# Patient Record
Sex: FEMALE | Race: BLACK OR AFRICAN AMERICAN | NOT HISPANIC OR LATINO | Employment: FULL TIME | URBAN - METROPOLITAN AREA
[De-identification: names, ages, dates, MRNs, and addresses within clinical notes are randomized per-mention and may not be internally consistent; named-entity substitution may affect disease eponyms.]

---

## 2017-03-02 ENCOUNTER — OFFICE VISIT (OUTPATIENT)
Dept: OBSTETRICS AND GYNECOLOGY | Facility: CLINIC | Age: 32
End: 2017-03-02
Payer: COMMERCIAL

## 2017-03-02 VITALS
SYSTOLIC BLOOD PRESSURE: 125 MMHG | WEIGHT: 176.13 LBS | BODY MASS INDEX: 30.07 KG/M2 | DIASTOLIC BLOOD PRESSURE: 76 MMHG | HEIGHT: 64 IN

## 2017-03-02 DIAGNOSIS — Z01.419 ENCOUNTER FOR GYNECOLOGICAL EXAMINATION WITHOUT ABNORMAL FINDING: ICD-10-CM

## 2017-03-02 DIAGNOSIS — N63.0 BREAST MASS: ICD-10-CM

## 2017-03-02 DIAGNOSIS — N76.0 ACUTE VAGINITIS: Primary | ICD-10-CM

## 2017-03-02 DIAGNOSIS — N93.9 ABNORMAL UTERINE BLEEDING: ICD-10-CM

## 2017-03-02 DIAGNOSIS — Z00.00 ANNUAL PHYSICAL EXAM: ICD-10-CM

## 2017-03-02 PROCEDURE — 99999 PR PBB SHADOW E&M-EST. PATIENT-LVL III: CPT | Mod: PBBFAC,,, | Performed by: OBSTETRICS & GYNECOLOGY

## 2017-03-02 PROCEDURE — 99213 OFFICE O/P EST LOW 20 MIN: CPT | Mod: S$GLB,,, | Performed by: OBSTETRICS & GYNECOLOGY

## 2017-03-02 PROCEDURE — 87480 CANDIDA DNA DIR PROBE: CPT

## 2017-03-02 PROCEDURE — 1160F RVW MEDS BY RX/DR IN RCRD: CPT | Mod: S$GLB,,, | Performed by: OBSTETRICS & GYNECOLOGY

## 2017-03-02 RX ORDER — METRONIDAZOLE 500 MG/1
500 TABLET ORAL 3 TIMES DAILY
Qty: 30 TABLET | Refills: 2 | Status: SHIPPED | OUTPATIENT
Start: 2017-03-02 | End: 2017-03-12

## 2017-03-02 NOTE — PROGRESS NOTES
Subjective:      Chief Complaint:  MIDCYCLE  BLEEDING  Chief Complaint   Patient presents with    Gynecologic Exam       Menstrual History:    OB History      Para Term  AB TAB SAB Ectopic Multiple Living    4 0   3     0        Obstetric Comments    LMP 10/17/12          Menarche age: 11     Patient's last menstrual period was 2017.           Objective:        History of Present Illness AND  Examination detailed DICTATE:         PRESENT ILLNESS/PHYSICAL EXAMINATION NOTE:  The patient is 31 years of age.     4, para 0.  Two ectopic pregnancies and one miscarriage.  No medical   problem.  The patient Pap smears in 2016 was negative.  Ultrasound at that time   was negative.  Surgical procedure, laparoscopy, LEEP and exploratory laparotomy.    The patient presented to the clinic for annual examination complaining of   midcycle bleeding.  It is kind of pinkish.  No pain, discomfort or any other   problems.  The patient is also complaining of mild discharge.    REVIEW OF SYSTEMS:  HEAD, EAR, EYES, NOSE, AND THROAT:  Negative.  CARDIORESPIRATORY:  Negative.  GASTROINTESTINAL:  Negative.  GENITOURINARY:  See present illness.  NEUROMUSCULAR:  Negative.    PHYSICAL EXAMINATION:  GENERAL:  Well-developed, well-nourished, alert, oriented female, not in acute   distress.  VITAL SIGNS:  Blood pressure 125/76, weight 176.  HEAD:  Normocephalic.  EYES:  React.  NECK:  Supple.  Thyroid is not palpable.  No nodes.  CHEST:  Clear.  HEART:  Regular sinus rhythm, no murmur.  BREASTS:  Multinodular areas in both breasts, mobile.  No skin changes,   retraction, discharge, fixation.  Axilla is negative.  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  ABDOMEN:  Upper abdomen normal.  Lower abdomen normal.  No guarding or rebound.    Bowel sounds normal.  PELVIC:  External normal.  Vulva normal.  Bartholin, urethral and [place] glands   are negative.  Vagina discharge, whitish with odor.  Cervix clear.   Uterus is   slightly irregular and mostly anteriorly.  Both ovaries are negative.  Good   pelvic support, mobility noted.  No pain on examination.  RECTAL:  External negative.  MUSCULOSKELETAL:  Negative.  NEUROLOGIC:  Grossly normal.  SKIN:  Clear.    IMPRESSION:  Possible bacterial vaginosis, multiple breast lumps.    PLAN:  We will do a mammogram and breast ultrasound.  Also vaginal culture was   obtained.  We will start the patient on metronidazole one three times a day.  We   gave her refill on it.  If the discharged has not completely improved, refill   of medication and use it for another 10 days.  Also, multivitamin, activity,   exercise recommended and we will see the patient back in six months to   reevaluate the breast.      REGAN  dd: 03/02/2017 10:28:11 (CST)  td: 03/02/2017 17:45:46 (CST)  Doc ID   #[id number]  Job ID #[id number]    CC:          Assessment:      Diagnosis:GYN  EXAM   BREAST   MASS VAGINITIS   ABN  BLEEDING       Plan:      Return in 6  months

## 2017-03-02 NOTE — MR AVS SNAPSHOT
[facility] - OB/ GYN  [address]  [address]  [place] [address]  Phone: [phone]                  Tri Valles   3/2/2017 9:45 AM   Office Visit    Description:  Female : 1985   Provider:  Stephon Gifford MD   Department:  [facility] - OB/ GYN           Reason for Visit     Gynecologic Exam           Diagnoses this Visit        Comments    Acute vaginitis    -  Primary     Annual physical exam         Encounter for gynecological examination without abnormal finding         Breast mass         Abnormal uterine bleeding                To Do List           Goals (5 Years of Data)     None      Follow-Up and Disposition     Return in about 4 weeks (around 3/30/2017).       These Medications        Disp Refills Start End    metronidazole (FLAGYL) 500 MG tablet 30 tablet 2 3/2/2017 3/12/2017    Take 1 tablet (500 mg total) by mouth 3 (three) times daily. - Oral    [facility] [id number] [place] [address] -  [address] AT [facility] [address] Ph #: [phone]         [facility]     [facility] Care [facility] -  Assistance  Registered nurses in the [facility] provide clinical advisement, health education, appointment booking, and other advisory services.  Call for this free service at [phone].             Medications           Message regarding Medications     Verify the changes and/or additions to your medication regime listed below are the same as discussed with your clinician today.  If any of these changes or additions are incorrect, please notify your healthcare provider.        START taking these NEW medications        Refills    metronidazole (FLAGYL) 500 MG tablet 2    Sig: Take 1 tablet (500 mg total) by mouth 3 (three) times daily.    Class: Normal    Route: Oral           Verify that the below list of medications is an accurate representation of the medications you are currently taking.  If none reported, the list  may be blank. If incorrect, please contact your healthcare provider. Carry this list with you in case of emergency.           Current Medications     metronidazole (FLAGYL) 500 MG tablet Take 1 tablet (500 mg total) by mouth 3 (three) times daily.           Clinical Reference Information           Your Vitals Were     BP                   125/76           Blood Pressure          Most Recent Value    BP  125/76      Allergies as of 3/2/2017     No Known Allergies      Immunizations Administered on Date of Encounter - 3/2/2017     None      Orders Placed During Today's Visit      Normal Orders This Visit    Vaginosis Screen by DNA Probe     Future Labs/Procedures Expected by Expires    Mammo Digital Diagnostic Bilat with CAD  3/2/2017 5/3/2018    US Breast Bilateral Limited  3/2/2017 5/1/2018    US Pelvis Comp with Transvag NON-OB (xpd  3/2/2017 3/2/2018      [facility] Sign-Up     Activating your [facility] account is as easy as 1-2-3!     1) Visit [email or web address], select Sign Up Now, enter this activation code and your date of birth, then select Next.  [email or web address]  Expires: 4/16/2017  9:56 AM      2) Create a username and password to use when you visit [facility] in the future and select a security question in case you lose your password and select Next.    3) Enter your e-mail address and click Sign Up!    Additional Information  If you have questions, please e-mail [email or web address] or call [phone] to talk to our [facility] staff. Remember, [facility] is NOT to be used for urgent needs. For medical emergencies, dial 911.         Language Assistance Services     ATTENTION: Language assistance services are available, free of charge. Please call [phone].      ATENCIÓN: Si habla español, tiene a adams disposición servicios gratuitos de asistencia lingüística. Llame al [phone].     CHÚ Ý: N?u b?n nói Ti?ng Vi?t, có các d?ch v? h? tr? ngôn ng? mi?n phí dành cho b?n. G?i s? [phone].          [facility] complies with applicable Federal civil rights laws and does not discriminate on the basis of race, color, national origin, age, disability, or sex.

## 2017-03-03 LAB
CANDIDA RRNA VAG QL PROBE: NEGATIVE
G VAGINALIS RRNA GENITAL QL PROBE: POSITIVE
T VAGINALIS RRNA GENITAL QL PROBE: NEGATIVE

## 2017-03-07 ENCOUNTER — HOSPITAL ENCOUNTER (OUTPATIENT)
Dept: RADIOLOGY | Facility: HOSPITAL | Age: 32
Discharge: HOME | End: 2017-03-07
Attending: OBSTETRICS & GYNECOLOGY
Payer: COMMERCIAL

## 2017-03-07 ENCOUNTER — TELEPHONE (OUTPATIENT)
Dept: OBSTETRICS AND GYNECOLOGY | Facility: CLINIC | Age: 32
End: 2017-03-07

## 2017-03-07 DIAGNOSIS — N63.0 BREAST MASS: ICD-10-CM

## 2017-03-07 DIAGNOSIS — N93.9 ABNORMAL UTERINE BLEEDING: ICD-10-CM

## 2017-03-07 PROCEDURE — 77062 BREAST TOMOSYNTHESIS BI: CPT | Mod: 26,,, | Performed by: RADIOLOGY

## 2017-03-07 PROCEDURE — 77066 DX MAMMO INCL CAD BI: CPT | Mod: TC

## 2017-03-07 PROCEDURE — 76856 US EXAM PELVIC COMPLETE: CPT | Mod: TC

## 2017-03-07 PROCEDURE — 77066 DX MAMMO INCL CAD BI: CPT | Mod: 26,,, | Performed by: RADIOLOGY

## 2017-03-07 PROCEDURE — 76856 US EXAM PELVIC COMPLETE: CPT | Mod: 26,,, | Performed by: RADIOLOGY

## 2017-03-07 PROCEDURE — 76830 TRANSVAGINAL US NON-OB: CPT | Mod: 26,,, | Performed by: RADIOLOGY

## 2017-03-07 NOTE — TELEPHONE ENCOUNTER
Notes Recorded by Tierra Mcdaniel LPN on 3/7/2017 at 10:19 AM  SPOKE WITH MS Dylan, INFORMED HER THAT  HER TEST FOR B.V., INFORMED HER THAT DR MAHMOOD HAS SENT A RX FOR FLAGYL 500MG TO HER PHARMACY, PT STATED SHE HAS ALREADY STARTED THE ANTIBX, PT STATED HER UNDERSTANDING  ------

## 2017-03-07 NOTE — TELEPHONE ENCOUNTER
Notes Recorded by Tierra Mcdaniel LPN on 3/7/2017 at 4:34 PM  SPOKE WIT MS Richardson , INFORMED HER THAT DR MAHMOOD READ THE [facility] AND THE [place] OF THE PELVIS REPORTS AND BOTH ARE NORMAL, PT STATED HER UNDERSTANDING  ------

## 2017-06-05 PROBLEM — Z00.00 ANNUAL PHYSICAL EXAM: Status: RESOLVED | Noted: 2017-03-02 | Resolved: 2017-06-05

## 2018-01-24 ENCOUNTER — OFFICE VISIT (OUTPATIENT)
Dept: OBSTETRICS AND GYNECOLOGY | Facility: CLINIC | Age: 33
End: 2018-01-24
Payer: COMMERCIAL

## 2018-01-24 VITALS
HEIGHT: 64 IN | WEIGHT: 178.38 LBS | SYSTOLIC BLOOD PRESSURE: 115 MMHG | DIASTOLIC BLOOD PRESSURE: 71 MMHG | BODY MASS INDEX: 30.45 KG/M2

## 2018-01-24 DIAGNOSIS — N89.8 VAGINAL DISCHARGE: Primary | ICD-10-CM

## 2018-01-24 PROCEDURE — 99999 PR PBB SHADOW E&M-EST. PATIENT-LVL III: CPT | Mod: PBBFAC,,, | Performed by: OBSTETRICS & GYNECOLOGY

## 2018-01-24 PROCEDURE — 99213 OFFICE O/P EST LOW 20 MIN: CPT | Mod: S$GLB,,, | Performed by: OBSTETRICS & GYNECOLOGY

## 2018-01-24 PROCEDURE — 87491 CHLMYD TRACH DNA AMP PROBE: CPT

## 2018-01-24 PROCEDURE — 87480 CANDIDA DNA DIR PROBE: CPT

## 2018-01-24 RX ORDER — NEOMYCIN SULFATE, POLYMYXIN B SULFATE, HYDROCORTISONE 3.5; 10000; 1 MG/ML; [USP'U]/ML; MG/ML
SOLUTION/ DROPS AURICULAR (OTIC)
COMMUNITY
Start: 2017-11-28

## 2018-01-24 RX ORDER — MUPIROCIN 20 MG/G
OINTMENT TOPICAL
COMMUNITY
Start: 2017-11-28

## 2018-01-24 NOTE — PROGRESS NOTES
Subjective:      Chief Complaint:    Chief Complaint   Patient presents with    Vaginal Discharge       Menstrual History:    OB History      Para Term  AB Living    4 0     3 0    SAB TAB Ectopic Multiple Live Births                     Obstetric Comments    LMP 10/17/12          Menarche age: 11     Patient's last menstrual period was 01/10/2018.       HISTORY OF PRESENT ILLNESS:  The patient is 32 years of age, is a  4,   para 0.  Presents to the clinic because of vaginal discharge.  The patient's Pap   smear in 2016 negative.  Mammogram in 2017 negative.  Past history, ectopic   pregnancy x2.  The patient had laparoscopy and exploratory laparotomy, loop   excision because of abnormal Pap smear.  Last menstrual period 01/10/2018.  The   patient is as stated complaining of vaginal discharge, but no other problems.    PHYSICAL EXAMINATION:  VITAL SIGNS:  Blood pressure 115/71, weight 178.  ABDOMEN:  Soft.  No guarding, rebound or tenderness.  PELVIC:  External normal.  Vulva normal.  Bartholin, urethral and [place] glands   are negative.  Vagina is clear, no discharge, no odor, no inflammation.  Cervix   indicates lot of clear mucus.  I think we have an ovulatory event.  Uterus,   normal size.  Both ovaries are palpable and normal.    PLAN:  Vaginal cultures.  As stated, I think we have ovulatory event, mucus   production.  We will await the culture report before we treat if it is   indicated.  Reassured the patient that probably she is negative, I do not see   any evidence of infection.      DARCY/NICKOLAS  dd: 2018 09:28:46 (CST)  td: 2018 04:58:55 (CST)  Doc ID   #[id number]  Job ID #[id number]    CC:          Objective:        History of Present Illness AND  Examination detailed DICTATE:             Assessment:      Diagnosis: VAG   DISCHARGE       Plan:      Return in 6  months

## 2018-01-25 LAB
C TRACH DNA SPEC QL NAA+PROBE: NOT DETECTED
CANDIDA RRNA VAG QL PROBE: NEGATIVE
G VAGINALIS RRNA GENITAL QL PROBE: NEGATIVE
N GONORRHOEA DNA SPEC QL NAA+PROBE: NOT DETECTED
T VAGINALIS RRNA GENITAL QL PROBE: NEGATIVE

## 2018-01-29 ENCOUNTER — TELEPHONE (OUTPATIENT)
Dept: OBSTETRICS AND GYNECOLOGY | Facility: CLINIC | Age: 33
End: 2018-01-29

## 2018-01-29 NOTE — TELEPHONE ENCOUNTER
Pt notified of results. Informed her the rx sent to pharmacy was a mistake by the MD whom mistakenly read her results from 11 month ago and not the date for her recent culture. I told her to keep it just in case she has symptoms and pt replied she took it already. Kt

## 2018-01-29 NOTE — TELEPHONE ENCOUNTER
----- Message from April Fernandez sent at 1/29/2018  1:37 PM CST -----  Contact: Self   Patient would like someone to call her with her test results . Please call at [phone].

## 2018-03-22 ENCOUNTER — OFFICE VISIT (OUTPATIENT)
Dept: OBSTETRICS AND GYNECOLOGY | Facility: CLINIC | Age: 33
End: 2018-03-22
Payer: COMMERCIAL

## 2018-03-22 VITALS
WEIGHT: 176.69 LBS | HEIGHT: 64 IN | SYSTOLIC BLOOD PRESSURE: 120 MMHG | BODY MASS INDEX: 30.17 KG/M2 | DIASTOLIC BLOOD PRESSURE: 73 MMHG

## 2018-03-22 DIAGNOSIS — Z00.00 ANNUAL PHYSICAL EXAM: Primary | ICD-10-CM

## 2018-03-22 DIAGNOSIS — Z01.419 ENCOUNTER FOR GYNECOLOGICAL EXAMINATION WITHOUT ABNORMAL FINDING: ICD-10-CM

## 2018-03-22 PROCEDURE — 99395 PREV VISIT EST AGE 18-39: CPT | Mod: S$GLB,,, | Performed by: OBSTETRICS & GYNECOLOGY

## 2018-03-22 PROCEDURE — 99999 PR PBB SHADOW E&M-EST. PATIENT-LVL III: CPT | Mod: PBBFAC,,, | Performed by: OBSTETRICS & GYNECOLOGY

## 2018-03-22 NOTE — PATIENT INSTRUCTIONS

## 2018-03-22 NOTE — PROGRESS NOTES
Subjective:      Chief Complaint:    Chief Complaint   Patient presents with    Gynecologic Exam       Menstrual History:    OB History      Para Term  AB Living    4 0     3 0    SAB TAB Ectopic Multiple Live Births                     Obstetric Comments    LMP 10/17/12          Menarche age: 11     Patient's last menstrual period was 2018.            Objective:        History of Present Illness AND  Examination detailed DICTATE:   PRESENT ILLNESS AND PHYSICAL EXAMINATION NOTE:  The patient is 32 years of age,   here for annual exam.  Last menstrual period being 2018.  She is a    4, para 0, two ectopic and a miscarriage and spontaneous AB.  Pap smear in   2016, negative.  Mammogram in 2017, negative.  Ultrasound is negative in 2016.    The patient is here as stated for annual exam.  Review of medical problems,   none.  Surgery, laparoscopy, LEEP and exploratory laparotomy.  As stated, last   Pap smear was negative.  The patient has no problem or complaints, regular   normal cycles without any difficulty.  Bleeding is same, no change.    REVIEW OF SYSTEMS:  HEAD, EAR, EYES, NOSE, AND THROAT:  Negative.  CARDIORESPIRATORY:  Negative.  GASTROINTESTINAL:  Negative.  GENITOURINARY:  See present illness.  NEUROMUSCULAR:  No problem or complaint.    PHYSICAL EXAMINATION:  GENERAL:  Well-developed, well-nourished, alert, oriented female, not in acute   distress.  VITAL SIGNS:  Blood pressure 120/73, weight 176.  HEAD:  Normocephalic.  EYES:  React.  NECK:  Supple.  Thyroid is not palpable.  No nodes.  CHEST:  Clear.  HEART:  Regular sinus rhythm, no murmur.  BREASTS:  No lumps, masses, discharge, skin changes, retraction, nipple changes.    Axilla negative.  ABDOMEN:  Upper abdomen normal.  Lower abdomen normal.  No guarding, rebound or   tenderness.  Bowel sounds normal.  PELVIC:  External normal.  Vulva normal.  Bartholin, urethral and [place] glands   are negative.  Vagina is clear.   Cervix is clear.  Uterus normal size, shape,   position.  Both ovaries are palpable and normal.  Good pelvic support.  No pain   on exam.  RECTAL:  Negative.  MUSCULOSKELETAL:  Normal range of motion.  SKIN:  Clear.  Blood vessels normal.    IMPRESSION:  Essentially normal GYN exam without any problems.    PLAN:  Continue with multivitamin exercise, start one calcium, Citracal plus   daily and we will see the patient back within a year.      DARCY/NICKOLAS  dd: 03/22/2018 09:31:24 (CDT)  td: 03/23/2018 06:31:26 (CDT)  Doc ID   #[id number]  Job ID #[id number]    CC:               Assessment:      Diagnosis:  GYN    EXAM   ANNUAL  EXAM       Plan:      Return in 12  months

## 2019-03-28 ENCOUNTER — OFFICE VISIT (OUTPATIENT)
Dept: OBSTETRICS AND GYNECOLOGY | Facility: CLINIC | Age: 34
End: 2019-03-28
Payer: COMMERCIAL

## 2019-03-28 VITALS — BODY MASS INDEX: 30.73 KG/M2 | WEIGHT: 180 LBS | HEIGHT: 64 IN

## 2019-03-28 DIAGNOSIS — Z01.419 ENCOUNTER FOR GYNECOLOGICAL EXAMINATION WITHOUT ABNORMAL FINDING: ICD-10-CM

## 2019-03-28 DIAGNOSIS — N89.8 VAGINAL DISCHARGE: ICD-10-CM

## 2019-03-28 DIAGNOSIS — Z00.00 ANNUAL PHYSICAL EXAM: Primary | ICD-10-CM

## 2019-03-28 PROCEDURE — 99999 PR PBB SHADOW E&M-EST. PATIENT-LVL III: ICD-10-PCS | Mod: PBBFAC,,, | Performed by: OBSTETRICS & GYNECOLOGY

## 2019-03-28 PROCEDURE — 3008F BODY MASS INDEX DOCD: CPT | Mod: CPTII,S$GLB,, | Performed by: OBSTETRICS & GYNECOLOGY

## 2019-03-28 PROCEDURE — 99395 PR PREVENTIVE VISIT,EST,18-39: ICD-10-PCS | Mod: S$GLB,,, | Performed by: OBSTETRICS & GYNECOLOGY

## 2019-03-28 PROCEDURE — 99999 PR PBB SHADOW E&M-EST. PATIENT-LVL III: CPT | Mod: PBBFAC,,, | Performed by: OBSTETRICS & GYNECOLOGY

## 2019-03-28 PROCEDURE — 3008F PR BODY MASS INDEX (BMI) DOCUMENTED: ICD-10-PCS | Mod: CPTII,S$GLB,, | Performed by: OBSTETRICS & GYNECOLOGY

## 2019-03-28 PROCEDURE — 99395 PREV VISIT EST AGE 18-39: CPT | Mod: S$GLB,,, | Performed by: OBSTETRICS & GYNECOLOGY

## 2019-03-28 PROCEDURE — 87491 CHLMYD TRACH DNA AMP PROBE: CPT

## 2019-03-28 PROCEDURE — 88175 CYTOPATH C/V AUTO FLUID REDO: CPT

## 2019-03-28 NOTE — PROGRESS NOTES
Subjective:      Chief Complaint:    Chief Complaint   Patient presents with    Gynecologic Exam       Menstrual History:    OB History        4    Para   0    Term                AB   3    Living   0       SAB        TAB        Ectopic        Multiple        Live Births               Obstetric Comments   LMP 10/17/12             Menarche age: 11     Patient's last menstrual period was 2019 (exact date).                Objective:    PRESENT ILLNESS, PHYSICAL EXAMINATION NOTE    HISTORY OF PRESENT ILLNESS:  The patient is 33 years of age,  4, para 0.    Pap smear in 2016 negative.  Mammogram last year was negative.  The patient is   here for annual yearly exam.    PAST MEDICAL HISTORY:  Ectopic pregnancy x2, abnormal Pap smear.      PAST SURGICAL HISTORY:  Exploratory laparoscopy, loop excision.      The patient is doing well.  No complaint.  The last couple of cycles was   slightly irregular than most of the time, is coming down monthly.    PHYSICAL EXAMINATION:  VITAL SIGNS:  Blood pressure 120/73, weight 180.  BREASTS:  No lumps, mass, discharge, skin changes, retraction, nipple changes.    Axilla negative.  The patient has a dense breast with firm densities.  There was   no distinct lesion or mass.  PELVIC:  External normal.  Vulva normal.  Bartholin, urethra and [place] glands   are negative.  Vagina is clear.  Cervix clear.  Uterus, normal size, shape,   position.  Both ovaries are palpable and normal.  The patient had absent   fallopian tubes because of ectopic pregnancy.  RECTAL:  Negative.    The patient requested cultures.    PLAN:  Chlamydia, GC was done.  Pap smear also performed.    RECOMMENDATION:  Multivitamins, start calcium, vitamin D, Citracal Plus one   daily, and we will see the patient back within a year.      DARCY/IN  dd: 2019 14:11:16 (CDT)  td: 2019 05:09:32 (CDT)  Doc ID   #[id number]  Job ID #[id number]    CC:         History of Present Illness AND   Examination detailed DICTATE:        Physical Exam   Constitutional: She is oriented to person, place, and time. She appears well-developed and well-nourished. No distress.   HENT:   Head: Normocephali.  Eyes: Pupils are equal, round, and reactive to light.   Neck: Neck supple.   Cardiovascular: Normal rate, regular rhythm and normal heart sounds. No murmur heard.  Pulmonary/Chest: Effort normal and breath sounds normal. No respiratory distress. She has no wheezes. She has no rales. She exhibits no tenderness.   Abdominal: Bowel sounds are normal. She exhibits no distension and no mass. There is no tenderness. There is no rebound and no guarding.    Musculoskeletal: Normal range of motion.   Lymphadenopathy:        Right: No inguinal adenopathy present.        Left: No inguinal adenopathy present.   Neurological: She is alert and oriented.  Skin: Skin is warm. No rash noted.        Review of Systems  Review of Systems   Normal ROS:   Constitutional: Negative for fever, chills, activity change fatigue and unexpected weight change.   HENT: Negative for nosebleeds, congestion.  Eyes: Negative for visual disturbance.   Respiratory: Negative for shortness of breath and wheezing.    Cardiovascular: Negative for chest pain, palpitations.   Gastrointestinal: Negative for abdominal pain, diarrhea,    Musculoskeletal: Negative for back pain.   Allergic/Immunologic: Negative .   Neurological: Negative .   Hematological: Negative.   Psychiatric/Behavioral: Negative     Assessment:      Diagnosis: GYN   EXAM    PAP    VAG   DISCHARGE       Plan:      Return in 12  months

## 2019-03-28 NOTE — PATIENT INSTRUCTIONS

## 2019-03-29 LAB
C TRACH DNA SPEC QL NAA+PROBE: NOT DETECTED
N GONORRHOEA DNA SPEC QL NAA+PROBE: NOT DETECTED

## 2021-04-06 ENCOUNTER — OFFICE VISIT (OUTPATIENT)
Dept: OBSTETRICS AND GYNECOLOGY | Facility: CLINIC | Age: 36
End: 2021-04-06
Payer: COMMERCIAL

## 2021-04-06 VITALS
DIASTOLIC BLOOD PRESSURE: 55 MMHG | SYSTOLIC BLOOD PRESSURE: 110 MMHG | BODY MASS INDEX: 31.96 KG/M2 | WEIGHT: 187.19 LBS | HEIGHT: 64 IN

## 2021-04-06 DIAGNOSIS — N89.8 VAGINAL DISCHARGE: Primary | ICD-10-CM

## 2021-04-06 DIAGNOSIS — Z01.419 ROUTINE GYNECOLOGICAL EXAMINATION: ICD-10-CM

## 2021-04-06 DIAGNOSIS — N89.8 VAGINA ITCHING: ICD-10-CM

## 2021-04-06 PROCEDURE — 87481 CANDIDA DNA AMP PROBE: CPT | Mod: 59 | Performed by: OBSTETRICS & GYNECOLOGY

## 2021-04-06 PROCEDURE — 99395 PREV VISIT EST AGE 18-39: CPT | Mod: S$GLB,,, | Performed by: OBSTETRICS & GYNECOLOGY

## 2021-04-06 PROCEDURE — 99999 PR PBB SHADOW E&M-EST. PATIENT-LVL II: ICD-10-PCS | Mod: PBBFAC,,, | Performed by: OBSTETRICS & GYNECOLOGY

## 2021-04-06 PROCEDURE — 1126F PR PAIN SEVERITY QUANTIFIED, NO PAIN PRESENT: ICD-10-PCS | Mod: S$GLB,,, | Performed by: OBSTETRICS & GYNECOLOGY

## 2021-04-06 PROCEDURE — 87491 CHLMYD TRACH DNA AMP PROBE: CPT | Performed by: OBSTETRICS & GYNECOLOGY

## 2021-04-06 PROCEDURE — 87661 TRICHOMONAS VAGINALIS AMPLIF: CPT | Mod: 59 | Performed by: OBSTETRICS & GYNECOLOGY

## 2021-04-06 PROCEDURE — 1126F AMNT PAIN NOTED NONE PRSNT: CPT | Mod: S$GLB,,, | Performed by: OBSTETRICS & GYNECOLOGY

## 2021-04-06 PROCEDURE — 3008F BODY MASS INDEX DOCD: CPT | Mod: CPTII,S$GLB,, | Performed by: OBSTETRICS & GYNECOLOGY

## 2021-04-06 PROCEDURE — 87591 N.GONORRHOEAE DNA AMP PROB: CPT | Performed by: OBSTETRICS & GYNECOLOGY

## 2021-04-06 PROCEDURE — 99395 PR PREVENTIVE VISIT,EST,18-39: ICD-10-PCS | Mod: S$GLB,,, | Performed by: OBSTETRICS & GYNECOLOGY

## 2021-04-06 PROCEDURE — 3008F PR BODY MASS INDEX (BMI) DOCUMENTED: ICD-10-PCS | Mod: CPTII,S$GLB,, | Performed by: OBSTETRICS & GYNECOLOGY

## 2021-04-06 PROCEDURE — 99999 PR PBB SHADOW E&M-EST. PATIENT-LVL II: CPT | Mod: PBBFAC,,, | Performed by: OBSTETRICS & GYNECOLOGY

## 2021-04-07 LAB
C TRACH DNA SPEC QL NAA+PROBE: NOT DETECTED
N GONORRHOEA DNA SPEC QL NAA+PROBE: NOT DETECTED

## 2021-04-08 LAB
BACTERIAL VAGINOSIS DNA: POSITIVE
CANDIDA GLABRATA DNA: NEGATIVE
CANDIDA KRUSEI DNA: NEGATIVE
CANDIDA RRNA VAG QL PROBE: NEGATIVE
T VAGINALIS RRNA GENITAL QL PROBE: NEGATIVE

## 2021-04-10 ENCOUNTER — TELEPHONE (OUTPATIENT)
Dept: OBSTETRICS AND GYNECOLOGY | Facility: HOSPITAL | Age: 36
End: 2021-04-10

## 2021-04-10 DIAGNOSIS — B96.89 BACTERIAL VAGINOSIS: Primary | ICD-10-CM

## 2021-04-10 DIAGNOSIS — N76.0 BACTERIAL VAGINOSIS: Primary | ICD-10-CM

## 2021-04-10 RX ORDER — METRONIDAZOLE 500 MG/1
500 TABLET ORAL EVERY 12 HOURS
Qty: 14 TABLET | Refills: 0 | Status: SHIPPED | OUTPATIENT
Start: 2021-04-10 | End: 2021-04-17

## 2021-04-13 ENCOUNTER — TELEPHONE (OUTPATIENT)
Dept: OBSTETRICS AND GYNECOLOGY | Facility: CLINIC | Age: 36
End: 2021-04-13